# Patient Record
Sex: FEMALE | Race: WHITE
[De-identification: names, ages, dates, MRNs, and addresses within clinical notes are randomized per-mention and may not be internally consistent; named-entity substitution may affect disease eponyms.]

---

## 2019-07-12 ENCOUNTER — HOSPITAL ENCOUNTER (EMERGENCY)
Dept: HOSPITAL 25 - ED | Age: 24
Discharge: HOME | End: 2019-07-12
Payer: COMMERCIAL

## 2019-07-12 VITALS — SYSTOLIC BLOOD PRESSURE: 101 MMHG | DIASTOLIC BLOOD PRESSURE: 57 MMHG

## 2019-07-12 DIAGNOSIS — Z88.0: ICD-10-CM

## 2019-07-12 DIAGNOSIS — Z32.02: ICD-10-CM

## 2019-07-12 DIAGNOSIS — F17.200: ICD-10-CM

## 2019-07-12 DIAGNOSIS — N39.0: Primary | ICD-10-CM

## 2019-07-12 DIAGNOSIS — B96.20: ICD-10-CM

## 2019-07-12 LAB
ALBUMIN SERPL BCG-MCNC: 3.7 G/DL (ref 3.2–5.2)
ALBUMIN/GLOB SERPL: 1.4 {RATIO} (ref 1–3)
ALP SERPL-CCNC: 40 U/L (ref 34–104)
ALT SERPL W P-5'-P-CCNC: 9 U/L (ref 7–52)
ANION GAP SERPL CALC-SCNC: 4 MMOL/L (ref 2–11)
AST SERPL-CCNC: 12 U/L (ref 13–39)
BASOPHILS # BLD AUTO: 0 10^3/UL (ref 0–0.2)
BUN SERPL-MCNC: 12 MG/DL (ref 6–24)
BUN/CREAT SERPL: 21.8 (ref 8–20)
CALCIUM SERPL-MCNC: 9.1 MG/DL (ref 8.6–10.3)
CHLORIDE SERPL-SCNC: 106 MMOL/L (ref 101–111)
EOSINOPHIL # BLD AUTO: 0 10^3/UL (ref 0–0.6)
GLOBULIN SER CALC-MCNC: 2.7 G/DL (ref 2–4)
GLUCOSE SERPL-MCNC: 98 MG/DL (ref 70–100)
HCG SERPL QL: < 0.6 MIU/ML
HCO3 SERPL-SCNC: 26 MMOL/L (ref 22–32)
HCT VFR BLD AUTO: 37 % (ref 35–47)
HGB BLD-MCNC: 12.9 G/DL (ref 12–16)
LYMPHOCYTES # BLD AUTO: 1.2 10^3/UL (ref 1–4.8)
MCH RBC QN AUTO: 31 PG (ref 27–31)
MCHC RBC AUTO-ENTMCNC: 35 G/DL (ref 31–36)
MCV RBC AUTO: 90 FL (ref 80–97)
MONOCYTES # BLD AUTO: 0.7 10^3/UL (ref 0–0.8)
NEUTROPHILS # BLD AUTO: 6.9 10^3/UL (ref 1.5–7.7)
NRBC # BLD AUTO: 0 10^3/UL
NRBC BLD QL AUTO: 0
PLATELET # BLD AUTO: 201 10^3/UL (ref 150–450)
POTASSIUM SERPL-SCNC: 3.4 MMOL/L (ref 3.5–5)
PROT SERPL-MCNC: 6.4 G/DL (ref 6.4–8.9)
RBC # BLD AUTO: 4.12 10^6 /UL (ref 3.7–4.87)
RBC UR QL AUTO: (no result)
SODIUM SERPL-SCNC: 136 MMOL/L (ref 135–145)
WBC # BLD AUTO: 8.8 10^3/UL (ref 3.5–10.8)
WBC UR QL AUTO: (no result)

## 2019-07-12 PROCEDURE — 80053 COMPREHEN METABOLIC PANEL: CPT

## 2019-07-12 PROCEDURE — 81003 URINALYSIS AUTO W/O SCOPE: CPT

## 2019-07-12 PROCEDURE — 81015 MICROSCOPIC EXAM OF URINE: CPT

## 2019-07-12 PROCEDURE — 87086 URINE CULTURE/COLONY COUNT: CPT

## 2019-07-12 PROCEDURE — 87077 CULTURE AEROBIC IDENTIFY: CPT

## 2019-07-12 PROCEDURE — 72080 X-RAY EXAM THORACOLMB 2/> VW: CPT

## 2019-07-12 PROCEDURE — 84702 CHORIONIC GONADOTROPIN TEST: CPT

## 2019-07-12 PROCEDURE — 86140 C-REACTIVE PROTEIN: CPT

## 2019-07-12 PROCEDURE — 85025 COMPLETE CBC W/AUTO DIFF WBC: CPT

## 2019-07-12 PROCEDURE — 87186 SC STD MICRODIL/AGAR DIL: CPT

## 2019-07-12 PROCEDURE — 99283 EMERGENCY DEPT VISIT LOW MDM: CPT

## 2019-07-12 PROCEDURE — 36415 COLL VENOUS BLD VENIPUNCTURE: CPT

## 2019-07-12 PROCEDURE — 83690 ASSAY OF LIPASE: CPT

## 2019-07-12 NOTE — ED
Back Pain





- HPI Summary


HPI Summary: 





A 23 y/o female presents to [facility] with a chief complaint of back pain since 2 

days ago. She denies doing any heavy lifting. 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 

works often. She says that her pain is in her left flank and up to her rib 

cage. At triage she rated her pain as a 5/10 and reported dysuria. Her LNMP was 

last month but has abnormal menstrual cycles. She says that she has been 

dealing with back problems since she was 17 y/o. She denies drug use but 

reports occasional EtOH use and smoking 1/2 ppd. She has a FHx of HTN.





- History of Current Complaint


Chief Complaint: EDFlankPain


Stated Complaint: BACK PAIN PER PT


Time Seen by Provider: 07/12/19 10:03


Hx Obtained From: Patient


Onset/Duration: Sudden Onset, Lasting Days, Still Present


Onset/Duration: Started Days Ago, Still Present


Timing: Constant


Back Pain Location: Is Diffuse - more right sided


Severity Initially: Moderate


Severity Currently: Moderate


Pain Intensity: 5


Pain Scale Used: 0-10 Numeric


Character: Unable to Describe


Aggravating Symptom(s): Nothing


Alleviating Symptom(s): Nothing


Associated Signs And Symptoms: Positive: Other - dysuria.  Negative: Fever





- Allergies/Home Medications


Allergies/Adverse Reactions: 


 Allergies











Allergy/AdvReac Type Severity Reaction Status Date / Time


 


amoxicillin Allergy  Rash Verified 07/12/19 09:49














PMH/Surg Hx/FS Hx/Imm Hx


Endocrine/Hematology History: 


   Denies: Hx Diabetes


Cardiovascular History: 


   Denies: Hx Hypercholesterolemia, Hx Hypertension


Sensory History: 


   Denies: Hx Deafness


EENT History: 


   Denies: Hx Deafness


Infectious Disease History: No


Infectious Disease History: 


   Denies: Traveled Outside the [place] in Last 30 Days





- Family History


Known Family History: Positive: Hypertension





- Social History


Alcohol Use: Occasionally


Substance Use Type: Reports: None


Smoking Status (MU): Light Every Day Tobacco Smoker


Amount Used/How Often: 1/2 ppd





Review of Systems


Negative: Fever


Positive: dysuria, flank pain


Positive: Myalgia - back pain radiating to right rib


All Other Systems Reviewed And Are Negative: Yes





Physical Exam





- Summary


Physical Exam Summary: 





VITAL SIGNS: Reviewed.


GENERAL: Patient is a well-developed and nourished male who is lying 

comfortable in the stretcher. Patient is not in any acute respiratory distress.




HEAD AND FACE: Normocephalic and atraumatic.


EYES: PERRLA, EOMI x 2, No injected conjunctiva.


EARS: Hearing grossly intact. Ear canals and tympanic membranes are WNL.


MOUTH: Oropharynx within normal limits.


NECK: Supple, trachea is midline, no adenopathy, no JVD.


CHEST: Symmetric, no tenderness at palpation.


LUNGS: Clear to auscultation bilaterally. No wheezing or crackles.


CVS: RRR, S1 and S2 present, no murmurs or gallops appreciated.


ABDOMEN: Soft, non-tender. No signs of distention. Positive bowel sounds. No 

rebound, no guarding, and no masses palpated. No abdominal bruit or pulsations.


EXTREMITIES: some paraspinal T-spine tenderness. FROM in all major joints, no 

edema, no cyanosis or clubbing.


NEURO: Alert and oriented x 3. No acute neurological deficits. Speech is normal.


SKIN: Dry and warm.


Triage Information Reviewed: Yes


Vital Signs On Initial Exam: 


 Initial Vitals











Temp Pulse Resp BP Pulse Ox


 


 99.6 F   118   16   108/79   97 


 


 07/12/19 09:36  07/12/19 09:36  07/12/19 09:36  07/12/19 09:36  07/12/19 09:36











Vital Signs Reviewed: Yes





Diagnostics





- Vital Signs


 Vital Signs











  Temp Pulse Resp BP Pulse Ox


 


 07/12/19 09:36  99.6 F  118  16  108/79  97














- Laboratory


Result Diagrams: 


 07/12/19 10:32





 07/12/19 10:32


Lab Statement: Any lab studies that have been ordered have been reviewed, and 

results considered in the medical decision making process.





- Radiology


  ** thoracolumbar spine x-ray


Radiology Interpretation Completed By: Radiologist


Summary of Radiographic Findings: Negative exam.  ED physician has reviewed 

this imaging report.





Back Pain Course/Dx





- Course


Assessment/Plan: A 23 y/o female presents to [facility] with a chief complaint of 

back pain since 2 days ago. She denies doing any heavy lifting. She says that 

she is a CNA and works often. She says that her pain is in her left flank and 

up to her rib cage. At triage she rated her pain as a 5/10 and reported 

dysuria. Her LNMP was last month but has abnormal menstrual cycles. She says 

that she has been dealing with back problems since she was 17 y/o. She denies 

drug use but reports occasional EtOH use and smoking 1/2 ppd. She has a FHx of 

HTN.  Thoracolumbar spine x-ray impression: Negative.  Blood work without any 

significant abnormality except for potassium level of 3.4, CRP is 28.4.  Beta 

hCG is negative.  Urinalysis positive for UTI.  In the ED course the patient 

was given Bactrim.  The patient will be discharged home with follow-up with PCP 

and a prescription for Bactrim.  I discussed all the findings and test results 

with the patient. Patient was instructed to return to the emergency room 

immediately if any of the symptoms return worsens. Plan of care was discussed 

with the patient and understands and agrees. All questions were answered at 

patient satisfaction.  There were no further complaints or concerns. Lung exam 

before discharge: CTA B/L. Good air exchange. No wheezing or crackles heard. CVS

: S1 and S2 present. No murmurs appreciated. Patient is alert and oriented x 3. 

Patient is hemodynamically stable. Patient will be discharged home with follow 

up PCP in the next 2-3 days





- Diagnoses


Provider Diagnoses: 


 UTI (urinary tract infection)








Discharge





- Sign-Out/Discharge


Documenting (check all that apply): Patient Departure - DC


Patient Received Moderate/Deep Sedation with Procedure: No





- Discharge Plan


Condition: Stable


Disposition: HOME


Prescriptions: 


Sulfamethox/Trimethoprim DS* [Bactrim /160 TAB*] 1 tab PO BID #14 tab


Patient Education Materials:  Urinary Tract Infection in Women (DC)


Referrals: 


[facility] [Outside] (2-3 days)


Additional Instructions: 


FOLLOW UP WITH YOUR PRIMARY CARE PROVIDER WITHIN 2-3 DAYS


RETURN TO THE ED FOR ANY WORSENING OR NEW SYMPTOMS.





- Billing Disposition and Condition


Condition: STABLE


Disposition: Home





- Attestation Statements


Document Initiated by Varsha: Yes


Documenting Scribe: Damir Brown


Provider For Whom Varsha is Documenting (Include Credential): Stanley Kaufman MD


Scribe Attestation: 


Damir BELLO scribed for Stanley Kaufman MD on 07/13/19 at 1045. 


Scribe Documentation Reviewed: Yes


Provider Attestation: 


The documentation as recorded by the Damir banda accurately 

reflects the service I personally performed and the decisions made by me, 

Stanley Kaufman MD


Status of Scribe Document: Viewed

## 2019-07-15 NOTE — PN
Progress Note





- Progress Note


Date of Service: 07/12/19


Note: 


Urine culture preliminary grew Escherichia coli 100,000


Patient was placed on Bactrim prior to discharge


We'll await sensitivities